# Patient Record
Sex: MALE | Race: WHITE | URBAN - METROPOLITAN AREA
[De-identification: names, ages, dates, MRNs, and addresses within clinical notes are randomized per-mention and may not be internally consistent; named-entity substitution may affect disease eponyms.]

---

## 2017-01-10 ENCOUNTER — HOSPITAL ENCOUNTER (INPATIENT)
Facility: CLINIC | Age: 32
LOS: 2 days | Discharge: HOME | DRG: 897 | End: 2017-01-13
Attending: EMERGENCY MEDICINE | Admitting: PSYCHIATRY & NEUROLOGY
Payer: COMMERCIAL

## 2017-01-10 DIAGNOSIS — E55.9 VITAMIN D DEFICIENCY: ICD-10-CM

## 2017-01-10 DIAGNOSIS — F10.220 ACUTE ALCOHOLIC INTOXICATION IN ALCOHOLISM, UNCOMPLICATED (H): ICD-10-CM

## 2017-01-10 DIAGNOSIS — F41.1 GAD (GENERALIZED ANXIETY DISORDER): Primary | ICD-10-CM

## 2017-01-10 DIAGNOSIS — F33.1 MODERATE EPISODE OF RECURRENT MAJOR DEPRESSIVE DISORDER (H): ICD-10-CM

## 2017-01-10 DIAGNOSIS — F10.220 ALCOHOL DEPENDENCE WITH UNCOMPLICATED INTOXICATION (H): ICD-10-CM

## 2017-01-10 LAB
ALCOHOL BREATH TEST: 0.29 (ref 0–0.01)
AMPHETAMINES UR QL SCN: ABNORMAL
BARBITURATES UR QL: ABNORMAL
BENZODIAZ UR QL: ABNORMAL
CANNABINOIDS UR QL SCN: ABNORMAL
COCAINE UR QL: ABNORMAL
ETHANOL UR QL SCN: ABNORMAL
GLUCOSE BLDC GLUCOMTR-MCNC: 91 MG/DL (ref 70–99)
OPIATES UR QL SCN: ABNORMAL

## 2017-01-10 PROCEDURE — HZ2ZZZZ DETOXIFICATION SERVICES FOR SUBSTANCE ABUSE TREATMENT: ICD-10-PCS | Performed by: PSYCHIATRY & NEUROLOGY

## 2017-01-10 PROCEDURE — 00000146 ZZHCL STATISTIC GLUCOSE BY METER IP

## 2017-01-10 PROCEDURE — 25000132 ZZH RX MED GY IP 250 OP 250 PS 637: Performed by: EMERGENCY MEDICINE

## 2017-01-10 PROCEDURE — 82075 ASSAY OF BREATH ETHANOL: CPT | Performed by: EMERGENCY MEDICINE

## 2017-01-10 PROCEDURE — 99284 EMERGENCY DEPT VISIT MOD MDM: CPT | Mod: Z6 | Performed by: EMERGENCY MEDICINE

## 2017-01-10 PROCEDURE — 80307 DRUG TEST PRSMV CHEM ANLYZR: CPT | Performed by: EMERGENCY MEDICINE

## 2017-01-10 PROCEDURE — 80320 DRUG SCREEN QUANTALCOHOLS: CPT | Performed by: EMERGENCY MEDICINE

## 2017-01-10 PROCEDURE — 99285 EMERGENCY DEPT VISIT HI MDM: CPT | Performed by: EMERGENCY MEDICINE

## 2017-01-10 RX ORDER — LANOLIN ALCOHOL/MO/W.PET/CERES
100 CREAM (GRAM) TOPICAL ONCE
Status: COMPLETED | OUTPATIENT
Start: 2017-01-10 | End: 2017-01-10

## 2017-01-10 RX ORDER — FOLIC ACID 1 MG/1
1 TABLET ORAL ONCE
Status: COMPLETED | OUTPATIENT
Start: 2017-01-10 | End: 2017-01-10

## 2017-01-10 RX ORDER — MULTIPLE VITAMINS W/ MINERALS TAB 9MG-400MCG
1 TAB ORAL ONCE
Status: COMPLETED | OUTPATIENT
Start: 2017-01-10 | End: 2017-01-10

## 2017-01-10 RX ADMIN — Medication 100 MG: at 22:56

## 2017-01-10 RX ADMIN — FOLIC ACID 1 MG: 1 TABLET ORAL at 22:56

## 2017-01-10 RX ADMIN — MULTIPLE VITAMINS W/ MINERALS TAB 1 TABLET: TAB at 22:56

## 2017-01-10 ASSESSMENT — ENCOUNTER SYMPTOMS
ABDOMINAL PAIN: 0
ARTHRALGIAS: 0
EYE REDNESS: 0
DIFFICULTY URINATING: 0
HEADACHES: 0
SHORTNESS OF BREATH: 0
FEVER: 0
NECK STIFFNESS: 0
CONFUSION: 0
COLOR CHANGE: 0

## 2017-01-10 NOTE — IP AVS SNAPSHOT
MRN:[id number]                      After Visit Summary   1/10/2017    Wilber Romo    MRN: [id number]           Thank you!     Thank you for choosing [facility] for your care. Our goal is always to provide you with excellent care.        Patient Information     Date Of Birth          1985        About your hospital stay     You were admitted on:  January 11, 2017 You last received care in the:  [facility]    You were discharged on:  January 13, 2017       Who to Call     For medical emergencies, please call 911.  For non-urgent questions about your medical care, please call your primary care provider or clinic, None          Attending Provider     Provider    Amaury Rene MD Simon, Mitch THOMPSON MD       Primary Care Provider    Physician No Ref-Primary       No address on file        Further instructions from your care team       Behavioral Discharge Planning and Instructions  THANK YOU FOR CHOOSING THE [address]  [phone]    Summary: You were admitted to [facility] on 10/10/17 for detoxification from alcohol.  A medical exam was performed that included lab work. You have met with a  and opted to continue your treatement with [facility].  Please take care and make your recovery a priority, Wilber!     [address]:  [phone]    Main Diagnoses:  Per Dr. Lundy    DIAGNOSTIC IMPRESSION:    Axis I:  Alcohol use disorder, severe with withdrawal.    2.  Moderate major depression, recurrent, F33.1.    3.  Generalized anxiety disorder, F41.1.    Major Treatments, Procedures and Findings:  You were treated for alcohol detoxification using valium.New medications were ordered during your treatment say.   You have had a chemical dependency assessment.  You have had labs drawn and those results have been reviewed with you. Please take a copy of  your lab work with you to your next primary care physician appointment.    Symptoms to Report:  If you experience more anxiety, confusion, sleeplessness, deep sadness or thoughts of suicide, notify your treatment team or notify your primary care physician. IF ANY OF THE SYMPTOMS YOU ARE EXPERIENCING ARE A MEDICAL EMERGENCY CALL 911 IMMEDIATELY.     Lifestyle Adjustment: Health Action Plan:  1.Create a daily schedule  2. Eat Healthy  3. Plan Enjoyable Sober Activities  4. Use Problem Solving Skills and Deal with Issues as they Arise.   5. Be Physically Active  6. Take your medications as prescribed  7. Get enough restful sleep  8. Practice Relaxation  9. Spend time with Supportive People  10. No use of alcohol, illegal drugs or addictive medications other than what is currently prescribed.   11.AA, NA Sponsor are excellent resources for support    Keeping hands clean is one of the most important steps we can take to avoid getting sick and spreading germs to others.  Please wash your hands frequently.        Follow-up Appointment:   Dr. Lucas Mclaughlin [facility]  Wednesday, February 15, 2017 @ 2:00 pm    Resources:   http://www.aastpaul.org/?topic=8  http://a[place].org/meetings/  http://www.nam[place].org/index.php/meeting-list-pdf  http://www.harmreduction.org  [facility] 323-960-7554  Support Group:  AA/TEN and Sponsor/support  Crisis Intervention: 932.735.2966 or 517-444-7324 (TTY: 623.793.7525).  Call anytime for help.  [place] on Mental Illness (www.[place].sav.org): 233.643.4043 or 893-177-3458.  Alcoholics Anonymous (www.alcoholics-anonymous.org): Check your phone book for your local chapter.  Suicide Awareness Voices of Education (SAVE) (www.save.org): 335-923-PYHF (2256)  National Suicide Prevention Line (www.mentalhealthmn.org): 332-087-JEWG (0401)  Mental Health Consumer/Survivor Network of [place] (www.mhcsn.net): 332.731.8349 or 828-128-6484  Mental Health Association [facility]  (www.mentalhealth.org): 226.913.8984 or 358-933-1521   Substance Abuse and Mental Health Services (www.samhsa.gov)   Www.smartrecovery.org  SMART Recovery's 4-Point Program  helps people recover from all types of addictive behaviors, including: alcoholism, drug abuse, substance abuse, drug addiction, alcohol abuse, gambling addiction, cocaine addiction, and addiction to other substances and activities.  SMART Recovery (Self-Management And Recovery Training) is not a 12-step group, like Alcoholics Anonymous (AA) or Narcotics Anonymous (NA).  Teaches self-empowerment and self-reliance.  * Encourages individuals to recover and live satisfying lives.  * Teaches tools and techniques for self-directed change.  * Meetings are educational and include open discussions.  * Advocates the appropriate use of prescribed medications and psychological treatments.  * Evolves as scientific knowledge of addiction recovery evolves.  Many of the modules are available online.      [place] Recovery Connection ([facility])  [facility] connects people seeking recovery to resources that help foster and sustain long-term recovery.  Whether you are seeking resources for treatment, transportation, housing, job training, education, health care or other pathways to recovery, [facility] is a great place to start.  454.865.8974.  www.[facility]525j.com.cn.org    General Medication Instructions:   See your medication sheet(s) for instructions.   Take all medicines as directed.  Make no changes unless your doctor suggests them.   Go to all your doctor visits.  Be sure to have all your required lab tests. This way, your medicines can be refilled on time.  AA/NA and Sponsors are excellent resources for support and you can find one at any support group meeting.  Do not use any form of alcohol.    Please Note:  If you have any questions at anytime after you are discharged please call the [facility] [place] detox unit 3AW unit at  "[phone].  [facility], Behavioral Intake [phone]  Please take this discharge folder with you to all your follow up appointments, it contains your lab results, diagnosis, medication list and discharge recommendations.      THANK YOU FOR CHOOSING THE [facility]     The treatment team has appreciated the opportunity to work with you.  We wish you the best in the future.        Pending Results     No orders found from 2017 to 2017.            Statement of Approval     Ordered          17 0855  I have reviewed and agree with all the recommendations and orders detailed in this document.   EFFECTIVE NOW     Comments:  Please discharge today, either to treatment or to his parent's home.  Please send dc med list and labs to his md   Approved and electronically signed by:  Mitch Lundy MD             Admission Information        Provider Department Dept Phone    1/10/2017 Mitch Lundy MD [facility]  [phone]      Your Vitals Were     Blood Pressure Pulse Temperature    138/88 mmHg 89 97.5  F (36.4  C) (Tympanic)    Respirations Height Weight    16 1.93 m (6' 4\") 102.059 kg (225 lb)    BMI (Body Mass Index) Pulse Oximetry       27.40 kg/m2 98%       MyChart Information     [employer] lets you send messages to your doctor, view your test results, renew your prescriptions, schedule appointments and more. To sign up, go to [email or web address] . Click on \"Log in\" on the left side of the screen, which will take you to the Welcome page. Then click on \"Sign up Now\" on the right side of the page.     You will be asked to enter the access code listed below, as well as some personal information. Please follow the directions to create your username and password.     Your access code is: [email or web address]  Expires: 3/8/2017  8:24 AM     Your access code will  in 90 days. If you need help or a new code, please call your [facility] clinic or [phone].        Care " EveryWhere ID     This is your Care EveryWhere ID. This could be used by other organizations to access your [facility] medical records  [id number]           Review of your medicines      START taking        Dose / Directions    acamprosate 333 MG EC tablet   Commonly known as:  CAMPRAL   Used for:  Alcohol dependence with uncomplicated intoxication (H)        Dose:  666 mg   Take 2 tablets (666 mg) by mouth 3 times daily   Quantity:  180 tablet   Refills:  3       cholecalciferol 2000 UNITS tablet   Used for:  Vitamin D deficiency        Dose:  2000 Units   Take 2,000 Units by mouth daily   Quantity:  90 tablet   Refills:  3       folic acid 1 MG tablet   Commonly known as:  FOLVITE   Used for:  Alcohol dependence with uncomplicated intoxication (H)        Dose:  1 mg   Take 1 tablet (1 mg) by mouth daily   Quantity:  90 tablet   Refills:  3       venlafaxine 75 MG Tb24 24 hr tablet   Commonly known as:  EFFEXOR-ER   Used for:  Moderate episode of recurrent major depressive disorder (H)        Dose:  75 mg   Take 1 tablet (75 mg) by mouth daily (with breakfast)   Quantity:  30 each   Refills:  3         CONTINUE these medicines which may have CHANGED, or have new prescriptions. If we are uncertain of the size of tablets/capsules you have at home, strength may be listed as something that might have changed.        Dose / Directions    * hydrOXYzine 25 MG tablet   Commonly known as:  ATARAX   This may have changed:  Another medication with the same name was added. Make sure you understand how and when to take each.   Used for:  Acute alcoholic intoxication in alcoholism, uncomplicated (H)        Dose:  25-50 mg   Take 1-2 tablets (25-50 mg) by mouth every 4 hours as needed for anxiety   Quantity:  120 tablet   Refills:  0       * hydrOXYzine 25 MG tablet   Commonly known as:  ATARAX   This may have changed:  You were already taking a medication with the same name, and this prescription was added. Make sure you  understand how and when to take each.   Used for:  EAMON (generalized anxiety disorder)        Dose:  25-50 mg   Take 1-2 tablets (25-50 mg) by mouth every 4 hours as needed for anxiety   Quantity:  120 tablet   Refills:  3       traZODone 50 MG tablet   Commonly known as:  DESYREL   This may have changed:    - when to take this  - reasons to take this   Used for:  Moderate episode of recurrent major depressive disorder (H)        Dose:  50 mg   Take 1 tablet (50 mg) by mouth At Bedtime   Quantity:  90 tablet   Refills:  3       * Notice:  This list has 2 medication(s) that are the same as other medications prescribed for you. Read the directions carefully, and ask your doctor or other care provider to review them with you.      CONTINUE these medicines which have NOT CHANGED        Dose / Directions    multivitamin, therapeutic with minerals Tabs tablet   Used for:  Acute alcoholic intoxication in alcoholism, uncomplicated (H)        Dose:  1 tablet   Take 1 tablet by mouth daily   Quantity:  90 each   Refills:  3       thiamine 100 MG tablet   Used for:  Acute alcoholic intoxication in alcoholism, uncomplicated (H)        Dose:  100 mg   Take 1 tablet (100 mg) by mouth daily   Quantity:  120 tablet   Refills:  3         STOP taking     escitalopram 10 MG tablet   Commonly known as:  LEXAPRO                Where to get your medicines      These medications were sent to [facility] [place], [place] - [address]  [address] [address]     Phone:  [phone]    - acamprosate 333 MG EC tablet  - cholecalciferol 2000 UNITS tablet  - folic acid 1 MG tablet  - hydrOXYzine 25 MG tablet  - multivitamin, therapeutic with minerals Tabs tablet  - thiamine 100 MG tablet  - traZODone 50 MG tablet  - venlafaxine 75 MG Tb24 24 hr tablet             Protect others around you: Learn how to safely use, store and throw away your medicines at www.disposemymeds.org.             Medication  List: This is a list of all your medications and when to take them. Check marks below indicate your daily home schedule. Keep this list as a reference.      Medications           Morning Afternoon Evening Bedtime As Needed    acamprosate 333 MG EC tablet   Commonly known as:  CAMPRAL   Take 2 tablets (666 mg) by mouth 3 times daily   Last time this was given:  666 mg on 1/13/2017  8:40 AM                                         cholecalciferol 2000 UNITS tablet   Take 2,000 Units by mouth daily   Last time this was given:  2,000 Units on 1/13/2017  8:40 AM                                   folic acid 1 MG tablet   Commonly known as:  FOLVITE   Take 1 tablet (1 mg) by mouth daily   Last time this was given:  1 mg on 1/13/2017  8:40 AM                                   * hydrOXYzine 25 MG tablet   Commonly known as:  ATARAX   Take 1-2 tablets (25-50 mg) by mouth every 4 hours as needed for anxiety                                   * hydrOXYzine 25 MG tablet   Commonly known as:  ATARAX   Take 1-2 tablets (25-50 mg) by mouth every 4 hours as needed for anxiety                                multivitamin, therapeutic with minerals Tabs tablet   Take 1 tablet by mouth daily   Last time this was given:  1 tablet on 1/13/2017  8:40 AM                                   thiamine 100 MG tablet   Take 1 tablet (100 mg) by mouth daily   Last time this was given:  100 mg on 1/13/2017  8:40 AM                                   traZODone 50 MG tablet   Commonly known as:  DESYREL   Take 1 tablet (50 mg) by mouth At Bedtime   Last time this was given:  50 mg on 1/12/2017  8:06 PM                                   venlafaxine 75 MG Tb24 24 hr tablet   Commonly known as:  EFFEXOR-ER   Take 1 tablet (75 mg) by mouth daily (with breakfast)   Last time this was given:  75 mg on 1/13/2017  8:40 AM                                   * Notice:  This list has 2 medication(s) that are the same as other medications prescribed for you. Read the  directions carefully, and ask your doctor or other care provider to review them with you.

## 2017-01-10 NOTE — IP AVS SNAPSHOT
[facility]    [address] [facility] [address]    Phone:  [phone]                                       After Visit Summary   1/10/2017    Wilber Romo    MRN: [id number]           After Visit Summary Signature Page     I have received my discharge instructions, and my questions have been answered. I have discussed any challenges I see with this plan with the nurse or doctor.    ..........................................................................................................................................  Patient/Patient Representative Signature      ..........................................................................................................................................  Patient Representative Print Name and Relationship to Patient    ..................................................               ................................................  Date                                            Time    ..........................................................................................................................................  Reviewed by Signature/Title    ...................................................              ..............................................  Date                                                            Time

## 2017-01-11 PROBLEM — F10.20 ALCOHOL DEPENDENCE (H): Status: ACTIVE | Noted: 2017-01-11

## 2017-01-11 PROCEDURE — 25000132 ZZH RX MED GY IP 250 OP 250 PS 637: Performed by: EMERGENCY MEDICINE

## 2017-01-11 PROCEDURE — 25000132 ZZH RX MED GY IP 250 OP 250 PS 637: Performed by: PSYCHIATRY & NEUROLOGY

## 2017-01-11 PROCEDURE — 12800008 ZZH R&B CD ADULT

## 2017-01-11 RX ORDER — ACAMPROSATE CALCIUM 333 MG/1
666 TABLET, DELAYED RELEASE ORAL 3 TIMES DAILY
Status: DISCONTINUED | OUTPATIENT
Start: 2017-01-11 | End: 2017-01-13 | Stop reason: HOSPADM

## 2017-01-11 RX ORDER — FOLIC ACID 1 MG/1
1 TABLET ORAL DAILY
Qty: 90 TABLET | Refills: 3 | Status: SHIPPED | OUTPATIENT
Start: 2017-01-11

## 2017-01-11 RX ORDER — VENLAFAXINE HYDROCHLORIDE 37.5 MG/1
37.5 TABLET, EXTENDED RELEASE ORAL
Status: COMPLETED | OUTPATIENT
Start: 2017-01-11 | End: 2017-01-11

## 2017-01-11 RX ORDER — MULTIPLE VITAMINS W/ MINERALS TAB 9MG-400MCG
1 TAB ORAL DAILY
Qty: 90 EACH | Refills: 3 | Status: SHIPPED | OUTPATIENT
Start: 2017-01-11

## 2017-01-11 RX ORDER — VENLAFAXINE HYDROCHLORIDE 37.5 MG/1
37.5 TABLET, EXTENDED RELEASE ORAL
Status: DISCONTINUED | OUTPATIENT
Start: 2017-01-11 | End: 2017-01-11

## 2017-01-11 RX ORDER — HYDROXYZINE HYDROCHLORIDE 25 MG/1
25-50 TABLET, FILM COATED ORAL EVERY 4 HOURS PRN
Status: DISCONTINUED | OUTPATIENT
Start: 2017-01-11 | End: 2017-01-13 | Stop reason: HOSPADM

## 2017-01-11 RX ORDER — TRAZODONE HYDROCHLORIDE 50 MG/1
50 TABLET, FILM COATED ORAL AT BEDTIME
Qty: 90 TABLET | Refills: 3 | Status: SHIPPED | OUTPATIENT
Start: 2017-01-11

## 2017-01-11 RX ORDER — ACAMPROSATE CALCIUM 333 MG/1
666 TABLET, DELAYED RELEASE ORAL 3 TIMES DAILY
Qty: 180 TABLET | Refills: 3 | Status: SHIPPED | OUTPATIENT
Start: 2017-01-11

## 2017-01-11 RX ORDER — NICOTINE 21 MG/24HR
1 PATCH, TRANSDERMAL 24 HOURS TRANSDERMAL DAILY
Status: DISCONTINUED | OUTPATIENT
Start: 2017-01-11 | End: 2017-01-13 | Stop reason: HOSPADM

## 2017-01-11 RX ORDER — VENLAFAXINE HYDROCHLORIDE 75 MG/1
75 TABLET, EXTENDED RELEASE ORAL
Status: DISCONTINUED | OUTPATIENT
Start: 2017-01-12 | End: 2017-01-13 | Stop reason: HOSPADM

## 2017-01-11 RX ORDER — HYDROXYZINE HYDROCHLORIDE 25 MG/1
25-50 TABLET, FILM COATED ORAL EVERY 4 HOURS PRN
Qty: 120 TABLET | Refills: 3 | Status: SHIPPED | OUTPATIENT
Start: 2017-01-11

## 2017-01-11 RX ORDER — VENLAFAXINE HYDROCHLORIDE 75 MG/1
75 TABLET, EXTENDED RELEASE ORAL
Qty: 30 EACH | Refills: 3 | Status: SHIPPED | OUTPATIENT
Start: 2017-01-12

## 2017-01-11 RX ORDER — TRAZODONE HYDROCHLORIDE 150 MG/1
150 TABLET ORAL AT BEDTIME
Qty: 90 TABLET | Refills: 1 | Status: SHIPPED | OUTPATIENT
Start: 2017-01-11 | End: 2017-01-11

## 2017-01-11 RX ORDER — LANOLIN ALCOHOL/MO/W.PET/CERES
100 CREAM (GRAM) TOPICAL DAILY
Status: COMPLETED | OUTPATIENT
Start: 2017-01-11 | End: 2017-01-13

## 2017-01-11 RX ORDER — MULTIPLE VITAMINS W/ MINERALS TAB 9MG-400MCG
1 TAB ORAL DAILY
Status: DISCONTINUED | OUTPATIENT
Start: 2017-01-11 | End: 2017-01-13 | Stop reason: HOSPADM

## 2017-01-11 RX ORDER — TRAZODONE HYDROCHLORIDE 150 MG/1
150 TABLET ORAL AT BEDTIME
Status: DISCONTINUED | OUTPATIENT
Start: 2017-01-11 | End: 2017-01-11

## 2017-01-11 RX ORDER — DIAZEPAM 5 MG
5-20 TABLET ORAL EVERY 30 MIN PRN
Status: DISCONTINUED | OUTPATIENT
Start: 2017-01-11 | End: 2017-01-13 | Stop reason: HOSPADM

## 2017-01-11 RX ORDER — FOLIC ACID 1 MG/1
1 TABLET ORAL DAILY
Status: DISCONTINUED | OUTPATIENT
Start: 2017-01-11 | End: 2017-01-13 | Stop reason: HOSPADM

## 2017-01-11 RX ORDER — TRAZODONE HYDROCHLORIDE 50 MG/1
50 TABLET, FILM COATED ORAL AT BEDTIME
Status: DISCONTINUED | OUTPATIENT
Start: 2017-01-11 | End: 2017-01-13 | Stop reason: HOSPADM

## 2017-01-11 RX ORDER — LANOLIN ALCOHOL/MO/W.PET/CERES
100 CREAM (GRAM) TOPICAL DAILY
Qty: 120 TABLET | Refills: 3 | Status: SHIPPED | OUTPATIENT
Start: 2017-01-11

## 2017-01-11 RX ORDER — ESCITALOPRAM OXALATE 10 MG/1
10 TABLET ORAL DAILY
Status: DISCONTINUED | OUTPATIENT
Start: 2017-01-11 | End: 2017-01-11

## 2017-01-11 RX ADMIN — FOLIC ACID 1 MG: 1 TABLET ORAL at 09:01

## 2017-01-11 RX ADMIN — ACAMPROSATE CALCIUM 666 MG: 333 TABLET, DELAYED RELEASE ORAL at 21:04

## 2017-01-11 RX ADMIN — DIAZEPAM 10 MG: 5 TABLET ORAL at 00:55

## 2017-01-11 RX ADMIN — DIAZEPAM 10 MG: 5 TABLET ORAL at 21:04

## 2017-01-11 RX ADMIN — TRAZODONE HYDROCHLORIDE 50 MG: 50 TABLET ORAL at 21:56

## 2017-01-11 RX ADMIN — ACAMPROSATE CALCIUM 666 MG: 333 TABLET, DELAYED RELEASE ORAL at 09:01

## 2017-01-11 RX ADMIN — ACAMPROSATE CALCIUM 666 MG: 333 TABLET, DELAYED RELEASE ORAL at 16:18

## 2017-01-11 RX ADMIN — DIAZEPAM 10 MG: 5 TABLET ORAL at 09:01

## 2017-01-11 RX ADMIN — MULTIPLE VITAMINS W/ MINERALS TAB 1 TABLET: TAB at 09:01

## 2017-01-11 RX ADMIN — Medication 100 MG: at 09:01

## 2017-01-11 RX ADMIN — DIAZEPAM 10 MG: 5 TABLET ORAL at 16:19

## 2017-01-11 RX ADMIN — DIAZEPAM 10 MG: 5 TABLET ORAL at 13:11

## 2017-01-11 RX ADMIN — NICOTINE 1 PATCH: 21 PATCH, EXTENDED RELEASE TRANSDERMAL at 09:00

## 2017-01-11 RX ADMIN — VENLAFAXINE HYDROCHLORIDE 37.5 MG: 37.5 TABLET, EXTENDED RELEASE ORAL at 09:01

## 2017-01-11 ASSESSMENT — ACTIVITIES OF DAILY LIVING (ADL)
LAUNDRY: WITH SUPERVISION
ORAL_HYGIENE: INDEPENDENT
DRESS: SCRUBS (BEHAVIORAL HEALTH)
GROOMING: INDEPENDENT

## 2017-01-11 NOTE — ED PROVIDER NOTES
History     Chief Complaint   Patient presents with     Alcohol Problem     Drinking 1/2 liter of Vodka daily. Seeing Detox and Treatment. Last drink 2 hours ago.      HPI  Wilber Romo is a 31 year old male who is emergency Department seeking detox from alcohol.  Patient states that he's been drinking alcohol daily for the past 1-2 weeks.  Patient states he's been drinking 1/2 L of vodka per day.  He states his last drink was approximately 2 hours prior to presentation to the emergency department.  He has had alcohol withdrawal the past and is consisted of tremors.  The patient denies a history of DTs.  He states he did have an alcohol withdrawal seizure 2 years ago.  Patient has a history of depression and anxiety.  He states that he ran out of his Lexapro 2 days ago.  Patient denies any suicide ideation.  Patient denies any recent fall or injury.  He denies any recent illness or medical concerns.  The patient had labs performed approximately one month ago when admitted to detox here.  They were normal at the time.    I have reviewed the Medications, Allergies, Past Medical and Surgical History, and Social History in the Epic system.    Review of Systems   Constitutional: Negative for fever.   HENT: Negative for congestion.    Eyes: Negative for redness.   Respiratory: Negative for shortness of breath.    Cardiovascular: Negative for chest pain.   Gastrointestinal: Negative for abdominal pain.   Genitourinary: Negative for difficulty urinating.   Musculoskeletal: Negative for arthralgias and neck stiffness.   Skin: Negative for color change.   Neurological: Negative for headaches.   Psychiatric/Behavioral: Negative for confusion.   All other systems reviewed and are negative.      Physical Exam   BP: 134/75 mmHg  Heart Rate: 74  Temp: 96.5  F (35.8  C)  Resp: 16  Height: 182.9 cm (6')  Weight: 102.059 kg (225 lb)  SpO2: 95 %  Physical Exam   Constitutional: He appears well-developed and well-nourished. No  distress.   HENT:   Head: Normocephalic and atraumatic.   Mouth/Throat: No oropharyngeal exudate.   Eyes: Pupils are equal, round, and reactive to light. No scleral icterus.   Neck: Normal range of motion.   Cardiovascular: Normal rate, regular rhythm, normal heart sounds and intact distal pulses.    Pulmonary/Chest: Effort normal and breath sounds normal. No respiratory distress.   Abdominal: Soft. Bowel sounds are normal. There is no tenderness.   Musculoskeletal: Normal range of motion. He exhibits no edema or tenderness.   Neurological: He is alert. He has normal strength. No cranial nerve deficit. Coordination and gait normal.   Skin: Skin is warm and dry. No rash noted. He is not diaphoretic.   Psychiatric: He has a normal mood and affect. His behavior is normal.   Nursing note and vitals reviewed.      ED Course   Procedures            Critical Care time:    Results for orders placed or performed during the hospital encounter of 01/10/17   Glucose by meter   Result Value Ref Range    Glucose 91 70 - 99 mg/dL   Alcohol breath test POCT   Result Value Ref Range    Alcohol Breath Test 0.288 (A) 0.00 - 0.01        Assessments & Plan (with Medical Decision Making)   31 year old male presents to the emergency department seeking detox from alcohol.  The patient's breathalyzer is currently 0.28.  He has a history of withdrawal including tremors.  His remote history of withdrawal seizure.  Patient does not have any medical concerns.  He has normal vital signs and a normal physical exam aside for mild alcohol intoxication.  His blood glucose level is normal.  The patient appears medically stable for detox admission.  I have reviewed the nursing notes.    I have reviewed the findings, diagnosis, plan and need for follow up with the patient.    New Prescriptions    No medications on file       Final diagnoses:   Alcohol dependence with uncomplicated intoxication (H)       1/10/2017   [facility], [place], EMERGENCY  DEPARTMENT      Amaury Rene MD  01/10/17 0250

## 2017-01-11 NOTE — H&P
"Mr. Wilber Romo is a 31-year-old man admitted to [facility] detox unit on 1/10/2017.  He was admitted to the hospital to detoxify from alcohol.  He was here for detox in 12/2016 and left 12/08.  He was sober for 3 weeks.  He attributes his relapse to depression and anxiety.  He is here now because he needs to go through withdrawal and states, \"I can't do it again by myself.\"  There is a history of a withdrawal seizure about 2 years ago.  He has been drinking approximately 1/2 liter per day and his alcohol level was 0.288.  Liver labs were mildly elevated when he was here previously in December.      He has a history of attention deficit disorder as a child with previous treatments being Ritalin, Adderall and some other longer acting form.  He does not believe he has taken Strattera.  Currently, he feels this is a mild impact on his life.  He notes his father is taking attention deficit medications still.  Depression symptoms consist of hopelessness, not wanting to do much, sporadic sleep, decreased appetite and occasional racing thoughts.  Anxiety symptoms consist of social anxiety, nervousness, twitching, shaking, and palpitations.  He screens negative on the MDQ for bipolar illness.      He has taken 1 medication for depression, Lexapro with partial results.  He has not taken past antidepressants.  He has taken hydroxyzine for anxiety with fairly good results and no side effects.  He has not taken any medication for alcoholism.      MENTAL STATUS EXAMINATION:  Shows an alert, cooperative 31-year-old man.  He notes he feels hung over.  Eye contact is good.  Affect is fair to good.  Psychomotor behavior is normal.  There are no signs of psychosis.  Speech rate, production and volume are normal.  He is not suicidal.  He does not appear to be hallucinating.      VITAL SIGNS:  Temperature 98.1, heart rate 84, respirations 16, blood pressure 119/71.  SPO2 98 on room air.      DIAGNOSTIC IMPRESSION: "   Axis I:  Alcohol use disorder, severe with withdrawal.   2.  Moderate major depression, recurrent, F33.1.   3.  Generalized anxiety disorder, F41.1.      PLAN:  Plan at this time is to switch the Lexapro to Effexor.  Campral will be started for alcohol cravings.  We discussed use of Strattera, but at this point he does not feel that is necessary.  He plans to go to [facility] following detoxification.         SELVIN CENTENO MD             D: 2017 08:40   T: 2017 08:50   MT:       Name:     TREVER CAMERON   MRN:      [id number]        Account:      [id number]   :      1985           Admitted:     [id number]      Document: [id number]

## 2017-01-11 NOTE — PLAN OF CARE
Problem: Substance Withdrawal  Goal: Substance Withdrawal  Signs and symptoms of listed problems will be absent or manageable. 1. Detoxification from Alcohol using the [facility] valium protocol  2. Patient will complete assessment paperwork  3. Patient will meet with  to discuss treatment and discharge planning  4. Physical examination and Lab evaluation by MD  5. Patients oral intake will be greater than 75 % of meals to meet estimated needs  6. Adequate fluid intake  Outcome: No Change  Pt being monitored for alcohol withdrawal. Pt was medicated x 2 this shift with 10 mg valium. Pt resting in bed off and on this shift. States he is having some mild depression rating his depression level a 2-3 on a 0-10 severe scale. Pt states last time he was here residential treatment was recommended but he did not follow through. States he is now motivated to go to treatment and wants to [facility].

## 2017-01-11 NOTE — CONSULTS
HISTORY OF PRESENT ILLNESS:  Wilber Romo is a 31-year-old male with reported history of major depressive disorder, alcohol dependence and panic disorder admitted to station [facility] for alcohol abuse and detoxification.  Reportedly drinking half a liter of hard liquor daily for the past couple of weeks.  Breathalyzer on admission 0.288.  The patient states the last use was 8 p.m. yesterday evening.  An Internal Medicine consultation was ordered by Dr. Lundy to assess medical problems including borderline elevated blood pressure on admission.  The patient denies history of primary hypertension.  Blood pressure this morning was 141/81, most recently 135/80.  The patient denies acute physical concerns, including chest pain, shortness of breath.      PAST MEDICAL HISTORY:   1.  Psychiatric history per Dr. Lundy.   2.  Denies history of major medical problems including cardiopulmonary disease, hypertension and diabetes.      PAST SURGICAL HISTORY:  None.      ADMISSION MEDICATIONS:   1.  Lexapro 10 mg p.o. daily.      ALLERGIES:  Penicillin.      SOCIAL HISTORY:  Single.  No children.  Lives in [place].  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  Smokes on average 10 cigarettes daily.  Last used alcohol last night at 8 p.m.      FAMILY HISTORY:  Reviewed and noncontributory.      REVIEW OF SYSTEMS:  Ten-point review of systems negative except as stated above in history of present illness.      PHYSICAL EXAMINATION:   GENERAL:  Nontoxic appearing young man in no acute distress.   VITAL SIGNS:  Stable.  Temperature afebrile, pulse 90s, blood pressure 130s/80s.   HEENT:  Negative.   NECK:  Supple.  No cervical lymphadenopathy or thyromegaly.   LUNGS:  Clear.   CARDIOVASCULAR:  Regular rate and rhythm, no murmurs appreciated.   ABDOMEN:  Soft, nontender.   EXTREMITIES:  No edema.   SKIN:  No rash noted on exposed areas.   NEUROLOGIC:  Awake, alert and oriented x3.  Cranial nerves grossly intact.  Motor strength is symmetric.  He  is not tremulous.      LABORATORY DATA:  Urine drug screen positive for alcohol.  Breathalyzer on admission 0.288.  Blood sugar 91.      IMPRESSION:   1.  Alcohol abuse and withdrawal per Dr. Lundy.   2.  Borderline elevated blood pressure since admission, stable, most likely secondary to alcohol withdrawal.  No history of essential hypertension.   3.  Otherwise, overall apparent normally good physical health.      PLAN:  Blood pressure will be monitored closely, otherwise no medical intervention is indicated at this time.  The patient is medically stable.  I will be happy to follow up and see him during his stay for any intercurrent medical issues.         RUPERTO LINDSEY PA-C             D: 2017 16:13   T: 2017 17:26   MT: GINNY      Name:     TREVER CAMERON   MRN:      [id number]        Account:       [id number]   :      1985           Consult Date:  2017      Document: [id number]

## 2017-01-11 NOTE — PROGRESS NOTES
Checked in with pt regarding aftercare plans.  He would like to go to [facility].  Writer called the admission coordinator at [facility] that pt worked with the last time he was here in December 2016, Maryfrank Nascimento (Phone: [phone], Fax: [phone]) and she requested that writer fax over pt's CD assessment done during the last hospitalization and his current H&P, facesheet, and ED note.  She also requested that pt call her to discuss his desire to come to the program.  Writer faxed over that information and provided pt with Mary's phone number.  Pt agreed to call her today.

## 2017-01-11 NOTE — PROGRESS NOTES
01/11/17 0136   Patient Belongings   Did you bring any home meds/supplements to the hospital?  No   Patient Belongings other (see comments)   Disposition of Belongings Tote, Locked Drawer   Belongings Search Yes   Clothing Search Yes   Second Staff Puneet RN 3AW     Tote: black jackt, pair of brown shoes, belt, watch    Locked Drawer: cell phone    ADMISSION:  I am responsible for any personal items that are not sent to the safe or pharmacy. [facility] is not responsible for loss, theft or damage of any property in my possession.    Patient Signature _____________________ Date/Time _____________________    Staff Signature _______________________ Date/Time _____________________    2nd Staff person, if patient is unable/unwilling to sign  ___________________________________ Date/Time _____________________  DISCHARGE:  All personal items have been returned to me.    Patient Signature _____________________ Date/Time _____________________    Staff Signature _______________________ Date/Time _____________________

## 2017-01-12 LAB
ALBUMIN SERPL-MCNC: 3.7 G/DL (ref 3.4–5)
ALP SERPL-CCNC: 82 U/L (ref 40–150)
ALT SERPL W P-5'-P-CCNC: 43 U/L (ref 0–70)
ANION GAP SERPL CALCULATED.3IONS-SCNC: 5 MMOL/L (ref 3–14)
AST SERPL W P-5'-P-CCNC: 31 U/L (ref 0–45)
BILIRUB SERPL-MCNC: 1.2 MG/DL (ref 0.2–1.3)
BUN SERPL-MCNC: 9 MG/DL (ref 7–30)
CALCIUM SERPL-MCNC: 8.9 MG/DL (ref 8.5–10.1)
CHLORIDE SERPL-SCNC: 108 MMOL/L (ref 94–109)
CO2 SERPL-SCNC: 28 MMOL/L (ref 20–32)
CREAT SERPL-MCNC: 0.88 MG/DL (ref 0.66–1.25)
DEPRECATED CALCIDIOL+CALCIFEROL SERPL-MC: 17 UG/L (ref 20–75)
ERYTHROCYTE [DISTWIDTH] IN BLOOD BY AUTOMATED COUNT: 11.5 % (ref 10–15)
GFR SERPL CREATININE-BSD FRML MDRD: NORMAL ML/MIN/1.7M2
GGT SERPL-CCNC: 49 U/L (ref 0–75)
GLUCOSE SERPL-MCNC: 96 MG/DL (ref 70–99)
HCT VFR BLD AUTO: 45 % (ref 40–53)
HGB BLD-MCNC: 15.6 G/DL (ref 13.3–17.7)
MCH RBC QN AUTO: 33.3 PG (ref 26.5–33)
MCHC RBC AUTO-ENTMCNC: 34.7 G/DL (ref 31.5–36.5)
MCV RBC AUTO: 96 FL (ref 78–100)
PLATELET # BLD AUTO: 170 10E9/L (ref 150–450)
POTASSIUM SERPL-SCNC: 4 MMOL/L (ref 3.4–5.3)
PROT SERPL-MCNC: 6.8 G/DL (ref 6.8–8.8)
RBC # BLD AUTO: 4.68 10E12/L (ref 4.4–5.9)
SODIUM SERPL-SCNC: 141 MMOL/L (ref 133–144)
TSH SERPL DL<=0.005 MIU/L-ACNC: 0.52 MU/L (ref 0.4–4)
WBC # BLD AUTO: 5.8 10E9/L (ref 4–11)

## 2017-01-12 PROCEDURE — 85027 COMPLETE CBC AUTOMATED: CPT | Performed by: PSYCHIATRY & NEUROLOGY

## 2017-01-12 PROCEDURE — 80053 COMPREHEN METABOLIC PANEL: CPT | Performed by: PSYCHIATRY & NEUROLOGY

## 2017-01-12 PROCEDURE — 36415 COLL VENOUS BLD VENIPUNCTURE: CPT | Performed by: PSYCHIATRY & NEUROLOGY

## 2017-01-12 PROCEDURE — 84443 ASSAY THYROID STIM HORMONE: CPT | Performed by: PSYCHIATRY & NEUROLOGY

## 2017-01-12 PROCEDURE — 82306 VITAMIN D 25 HYDROXY: CPT | Performed by: PSYCHIATRY & NEUROLOGY

## 2017-01-12 PROCEDURE — 12800008 ZZH R&B CD ADULT

## 2017-01-12 PROCEDURE — 25000132 ZZH RX MED GY IP 250 OP 250 PS 637: Performed by: EMERGENCY MEDICINE

## 2017-01-12 PROCEDURE — 82977 ASSAY OF GGT: CPT | Performed by: PSYCHIATRY & NEUROLOGY

## 2017-01-12 PROCEDURE — 25000132 ZZH RX MED GY IP 250 OP 250 PS 637: Performed by: PSYCHIATRY & NEUROLOGY

## 2017-01-12 PROCEDURE — H0001 ALCOHOL AND/OR DRUG ASSESS: HCPCS

## 2017-01-12 RX ADMIN — TRAZODONE HYDROCHLORIDE 50 MG: 50 TABLET ORAL at 20:06

## 2017-01-12 RX ADMIN — MULTIPLE VITAMINS W/ MINERALS TAB 1 TABLET: TAB at 08:36

## 2017-01-12 RX ADMIN — ACAMPROSATE CALCIUM 666 MG: 333 TABLET, DELAYED RELEASE ORAL at 14:46

## 2017-01-12 RX ADMIN — NICOTINE 1 PATCH: 21 PATCH, EXTENDED RELEASE TRANSDERMAL at 08:35

## 2017-01-12 RX ADMIN — ACAMPROSATE CALCIUM 666 MG: 333 TABLET, DELAYED RELEASE ORAL at 20:06

## 2017-01-12 RX ADMIN — FOLIC ACID 1 MG: 1 TABLET ORAL at 08:36

## 2017-01-12 RX ADMIN — ACAMPROSATE CALCIUM 666 MG: 333 TABLET, DELAYED RELEASE ORAL at 08:36

## 2017-01-12 RX ADMIN — VENLAFAXINE HYDROCHLORIDE 75 MG: 75 TABLET, EXTENDED RELEASE ORAL at 08:36

## 2017-01-12 RX ADMIN — Medication 100 MG: at 08:36

## 2017-01-12 ASSESSMENT — ACTIVITIES OF DAILY LIVING (ADL)
GROOMING: INDEPENDENT
DRESS: STREET CLOTHES
ORAL_HYGIENE: INDEPENDENT
LAUNDRY: WITH SUPERVISION

## 2017-01-12 NOTE — PROGRESS NOTES
No medications for alcohol withdrawal this shift. Pt out on unit. Does not attend all of the unit programming. Pt has poor eye contact. Pt looking at treatment options. Continues on effexor titration.

## 2017-01-12 NOTE — CONSULTS
Internal Medicine Consult completed by Isrrael DIAMOND on 1/11/17. Please refer to his note for details.    Sarita Godinez PA-C  Hospitalist Service  [phone]

## 2017-01-12 NOTE — PROGRESS NOTES
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 practices. Include time spent obtaining, using, recovering from drugs or alcohol. (DSM)     I work, clean, drink and watch sports on T.V.     2B. How often do you spend more time than you planned using or use more than you planned? (DSM)     Most nights.     3. How important is using to your social connections? Do many of your family or friends use?     Not important at all.        4A. Are you currently in a significant relationship? No    4C. Sexual Orientation: Heterosexual    5A. Who do you live with?  2 college friends.    5B. Tell me about their alcohol/drug use and mental health issues. The people I live with have no alcohol/drug use and/or mental health issues.    5C. Are you concerned for your safety there? no.    5D. Are you concerned about the safety of anyone else who lives with you? no.    6A. Do you have children who live with you? NA    6B. Do you have children who do not live with you? NA    7A. Who supports you in making changes in your alcohol or drug use? What are they willing to do to support you? Who is upset or angry about you making changes in your alcohol or drug use? How big a problem is  this for you?      My family and friends are supportive. I am sure some would help if I needed something and if they knew I was working hard on my sobriety.     7B. This table is provided to record information about the person s relationships and available support It is not necessary to ask each item; only to get a comprehensive picture of their support system.  How often can you count on the following people when you need someone?    Partner / Spouse  N/A    Parent(s)/Aunt(s)/Uncle(s)/Grandparents  Always supportive    Sibling(s)/Cousin(s)  Always supportive    Child(liya)  N/A    Other relative(s)  Always supportive    Friend(s)/neighbor(s)  Always supportive    Child(liya) s father(s)/mother(s)  N/A    Support group member(s)  Always supportive    Community of raiza members  Always supportive    /counselor/therapist/healer  Always supportive    Other (specify)  N/A      8A. What is your current living situation?     I am currently living with 2 roomates.    8B. What is your long term plan for where you will be living?     I plan to move back in with my parents for a bit then move back in with my 2 roommates.     8C. Tell me about your living environment/neighborhood? Ask enough follow up questions to determine safety, criminal activity, availability of alcohol and drugs, supportive or antagonistic to the person making changes.      Everything is safe and I have no concerns.      9. Criminal justice history: Gather current/recent history and any significant history related to substance use--Arrests? Convictions? Circumstances? Alcohol or drug involvement? Sentences? Still on probation or parole? Expectations of the court? Current court order? Any sex offenses - lifetime? What level? (DSM)    NA    10. What obstacles exist to participating in treatment? (Time off work, childcare, funding, transportation, pending nursing home time, living situation)     Work/finances      Dimension VI Ratings    Recovery  environment - The placing authority must use the criteria in Dimension VI to determine a client s recovery environment.    RISK DESCRIPTIONS - Severity ratin Client is engaged in structured, meaningful activity and has a supportive significant other, family, and living environment.    REASONS SEVERITY WAS ASSIGNED - (What support does the person have for making changes? What structure/stability does the person have in his or her daily life that will increase the likelihood that changes can be sustained? What problems exist in the person s environment that will jeopardize getting/staying clean and sober?)     Patient reports his current living situation is supportive towards his recovery. Pt reports that he is currently living with 2 roomates. Pt reports that he lacks a daily structure other than work and meaningful activities. Pt reports that he is currently employed. Pt lacks a sober support network.   Pt denies any legal problems.         Client Choice/Exceptions    Would you like services specific to language, age, gender, culture, Protestant preference, race, ethnicity, sexual orientation or disability?  No    What particular treatment choices and options would you like to have? Not Sure.    Do you have a preference for a particular treatment program? Not Sure.      Criteria for Diagnosis      Criteria for Diagnosis  DSM-5 Criteria for Substance Use Disorder  Instructions: Determine whether the client currently meets the criteria for Substance Use Disorder using the diagnostic criteria in the DSM-V pp.484-781. Current means during the most recent 12 months outside a facility that controls access to substances      Category of Substance  Severity (ICD-10 Code / DSM 5 Code)      Alcohol Use Disorder  Severe  (10.20) (303.90)    Cannabis Use Disorder  NA    Hallucinogen Use Disorder  NA    Inhalant Use Disorder  NA    Opioid Use Disorder  NA    Sedative, Hypnotic, or Anxiolytic Use Disorder  NA    Stimulant  "Related Disorder  NA    Tobacco Use Disorder  Mild    (Z72.0) (305.1)    Other (or unknown) Substance Use Disorder  NA        Collateral Contact Summary    Number of contacts made: 2    Contact with referring person:  N/A.    If court related records were reviewed, summarize here: N/A    Information from collateral contacts supported/largely agreed with information from the client and associated risk ratings.      Rule 25 Assessment Summary and Plan    's Recommendation    1)  Complete a residential based or similar treatment program similar to [facility].    2)  Abstain from all mood-altering chemicals unless prescribed by a licensed provider.    3)  Attend weekly 12-step support group meetings.      4)  Actively work with a male sponsor or  through [place] [facility] ([phone]).    5)  Follow all the recommendations of your treatment/medical providers.  6)  Remain law abiding.  7)  Patient may benefit from obtaining a full mental health evaluation.  8)  Patient may benefit from 1:1 psychotherapy due to feelings of depression and/or anxiety.            Collateral Contacts        Name:    Dr. SWAPNA Garcia MD    Relationship:    3A Physician    Phone Number:    [phone]  Releases:          \"The patient began to drink alcohol in 2014, it became a problem.  He has tolerance, blackouts, withdrawal, progressive use, loss of control, used despite negative consequences, job, money, relationships.  He has tried to cut alcohol.  He realized that he needs to get help.  Does not use any drugs.  Smokes 1 pack a day.  In 2009 he does have anxiety.  When he gets anxiety, he feels like he cannot go into crowds.  He was going to [employer] school.  During this time that he would anxiety, his heart rate would increase, he would become shaky, shortness of breath, sweaty.  Denies suicidal or homicidal ideation, denied auditory or visual hallucinations.  He does have depression at times " "where he would feel sad, hopeless, would hurt himself.  Energy, motivation and interest suffer.  He does have a history of withdrawal seizures.  He has had blackouts as well.  He would have fleeting thoughts about he was better off if he did not wake up, but usually this is in the context of him going into withdrawal.  At this time, patient does not have suicidal or homicidal ideation, denied auditory or visual hallucinations.\"      Collateral Contacts        Name    [facility] Relationship     Phone Number      Releases            Mitch Lundy MD Physician Signed Psychiatry H&P 1/11/2017  8:40 AM        Mr. Wilber Romo is a 31-year-old man admitted to [facility] detox unit on 1/10/2017.  He was admitted to the hospital to detoxify from alcohol.  He was here for detox in 12/2016 and left 12/08.  He was sober for 3 weeks.  He attributes his relapse to depression and anxiety.  He is here now because he needs to go through withdrawal and states, \"I can't do it again by myself.\"  There is a history of a withdrawal seizure about 2 years ago.  He has been drinking approximately 1/2 liter per day and his alcohol level was 0.288.  Liver labs were mildly elevated when he was here previously in December.        He has a history of attention deficit disorder as a child with previous treatments being Ritalin, Adderall and some other longer acting form.  He does not believe he has taken Strattera.  Currently, he feels this is a mild impact on his life.  He notes his father is taking attention deficit medications still.  Depression symptoms consist of hopelessness, not wanting to do much, sporadic sleep, decreased appetite and occasional racing thoughts.  Anxiety symptoms consist of social anxiety, nervousness, twitching, shaking, and palpitations.  He screens negative on the MDQ for bipolar illness.        He has taken 1 medication for depression, Lexapro with partial results.  He has not taken past " antidepressants.  He has taken hydroxyzine for anxiety with fairly good results and no side effects.  He has not taken any medication for alcoholism.        MENTAL STATUS EXAMINATION:  Shows an alert, cooperative 31-year-old man.  He notes he feels hung over.  Eye contact is good.  Affect is fair to good.  Psychomotor behavior is normal.  There are no signs of psychosis.  Speech rate, production and volume are normal.  He is not suicidal.  He does not appear to be hallucinating.        VITAL SIGNS:  Temperature 98.1, heart rate 84, respirations 16, blood pressure 119/71. SPO2 98 on room air.        DIAGNOSTIC IMPRESSION:    Axis I:  Alcohol use disorder, severe with withdrawal.    2.  Moderate major depression, recurrent, F33.1.    3.  Generalized anxiety disorder, F41.1.        PLAN:  Plan at this time is to switch the Lexapro to Effexor.  Campral will be started for alcohol cravings.  We discussed use of Strattera, but at this point he does not feel that is necessary.  He plans to go to [facility] following detoxification.            SELVIN CENTENO MD                     :                      Name    Tiera Romo    Relationship    Mother  Phone Number    [phone]  Releases    Yes        Information Provided:  This writer reviewed this patients Electronic Medical Record and the information reviewed largely supports the information the patient reported during his CD evaluation.    llateral Contacts      A problematic pattern of alcohol/drug use leading to clinically significant impairment or distress, as manifested by at least two of the following, occurring within a 12-month period:    Alcohol/drug is often taken in larger amounts or over a longer period than was intended.  There is a persistent desire or unsuccessful efforts to cut down or control alcohol/drug use  A great deal of time is spent in activities necessary to obtain alcohol, use alcohol, or recover from its effects.  Craving, or a strong  desire or urge to use alcohol/drug  Recurrent alcohol/drug use resulting in a failure to fulfill major role obligations at work, school or home.  Continued alcohol use despite having persistent or recurrent social or interpersonal problems caused or exacerbated by the effects of alcohol/drug.  Important social, occupational, or recreational activities are given up or reduced because of alcohol/drug use.  Recurrent alcohol/drug use in situations in which it is physically hazardous.  Alcohol/drug use is continued despite knowledge of having a persistent or recurrent physical or psychological problem that is likely to have been caused or exacerbated by alcohol.  Tolerance, as defined by either of the following: A need for markedly increased amounts of alcohol/drug to achieve intoxication or desired effect. and A markedly diminished effect with continued use of the same amount of alcohol/drug.  Withdrawal, as manifested by either of the following: The characteristic withdrawal syndrome for alcohol/drug (refer to Criteria A and B of the criteria set for alcohol/drug withdrawal). and Alcohol/drug (or a closely related substance, such as a benzodiazepine) is taken to relieve or avoid withdrawal symptoms.      Specify if: In early remission:  After full criteria for alcohol/drug use disorder were previously met, none of the criteria for alcohol/drug use disorder have been met for at least 3 months but for less than 12 months (with the exception that Criterion A4,  Craving or a strong desire or urge to use alcohol/drug  may be met).      In sustained remission:    After full criteria for alcohol use disorder were previously met, non of the criteria for alcohol/drug use disorder have been met at any time during a period of 12 months or longer (with the exception that Criterion A4,  Craving or strong desire or urge to use alcohol/drug  may be met).    Specify if:    This additional specifier is used if the individual is in an  environment where access to alcohol is restricted.    Mild: Presence of 2-3 symptoms    Moderate: Presence of 4-5 symptoms    Severe: Presence of 6 or more symptoms

## 2017-01-12 NOTE — PROGRESS NOTES
Met with Pt and updated his assessment.  Pt signed BENY and [facility] [place] [facility].

## 2017-01-13 VITALS
TEMPERATURE: 97.5 F | OXYGEN SATURATION: 98 % | WEIGHT: 225 LBS | HEART RATE: 89 BPM | HEIGHT: 76 IN | SYSTOLIC BLOOD PRESSURE: 138 MMHG | RESPIRATION RATE: 16 BRPM | DIASTOLIC BLOOD PRESSURE: 88 MMHG | BODY MASS INDEX: 27.4 KG/M2

## 2017-01-13 PROCEDURE — 25000132 ZZH RX MED GY IP 250 OP 250 PS 637: Performed by: PSYCHIATRY & NEUROLOGY

## 2017-01-13 PROCEDURE — 25000132 ZZH RX MED GY IP 250 OP 250 PS 637: Performed by: EMERGENCY MEDICINE

## 2017-01-13 RX ADMIN — VENLAFAXINE HYDROCHLORIDE 75 MG: 75 TABLET, EXTENDED RELEASE ORAL at 08:40

## 2017-01-13 RX ADMIN — FOLIC ACID 1 MG: 1 TABLET ORAL at 08:40

## 2017-01-13 RX ADMIN — NICOTINE 1 PATCH: 21 PATCH, EXTENDED RELEASE TRANSDERMAL at 08:39

## 2017-01-13 RX ADMIN — Medication 100 MG: at 08:40

## 2017-01-13 RX ADMIN — VITAMIN D, TAB 1000IU (100/BT) 2000 UNITS: 25 TAB at 08:40

## 2017-01-13 RX ADMIN — MULTIPLE VITAMINS W/ MINERALS TAB 1 TABLET: TAB at 08:40

## 2017-01-13 RX ADMIN — ACAMPROSATE CALCIUM 666 MG: 333 TABLET, DELAYED RELEASE ORAL at 08:40

## 2017-01-13 ASSESSMENT — ACTIVITIES OF DAILY LIVING (ADL)
LAUNDRY: WITH SUPERVISION
ORAL_HYGIENE: INDEPENDENT
GROOMING: INDEPENDENT
DRESS: STREET CLOTHES

## 2017-01-13 NOTE — PLAN OF CARE
Problem: General Plan of Care (Inpatient Behavioral)  Goal: Individualization/Patient Specific Goal (IP Behavioral)  The patient and/or their representative will achieve their patient-specific goals related to the plan of care.    The patient-specific goals include:Illnesses Management & Recovery  Patient identified as trigger for relapse.  Patient identified as a general wellness strategy.  Patient identified as a warning sign that they are in danger of relapse.  Patient identified as someone they cont on to get feedback from.  Patient identified as a way to take action when in danger of relapse.  Patient identified as a way to cope with stress or other symptoms.     Illnesses Management & Recovery  Patient identified stress as trigger for relapse.  Patient identified  Practicing relaxing as a general wellness strategy.  Patient identified feeling too confident as a warning sign that they are in danger of relapse.  Patient identified Luke as someone they cont on to get feedback from.  Patient identified go to a meeting as a way to take action when in danger of relapse.  Patient identifiedexercise  as a way to cope with stress or other symptoms.

## 2017-01-13 NOTE — PLAN OF CARE
Problem: Discharge Planning  Goal: Discharge Planning (Adult, OB, Behavioral, Peds)  Outcome: Adequate for Discharge Date Met:  01/13/17  Pt alcohol withdrawal is complete. Pt out on unit. Pt will be going to [facility] treatment program today. Family to transport. Pt tolerating new medications. Reports his mood as good. Denies SI. Appetite has been fair. Pt reports having some sleep issues. Not sleeping well. Discussed sleep hygiene. Denies SE of medications See discharge instructions.

## 2017-01-13 NOTE — DISCHARGE INSTRUCTIONS
Behavioral Discharge Planning and Instructions  THANK YOU FOR CHOOSING THE [facility]  3A  [phone]    Summary: You were admitted to [facility] on 10/10/17 for detoxification from alcohol.  A medical exam was performed that included lab work. You have met with a  and opted to continue your treatement with [facility].  Please take care and make your recovery a priority, Wilber!     [address] [id number], [place] [address]  Ama:  [phone]    Main Diagnoses:  Per Dr. Lundy    DIAGNOSTIC IMPRESSION:    Axis I:  Alcohol use disorder, severe with withdrawal.    2.  Moderate major depression, recurrent, F33.1.    3.  Generalized anxiety disorder, F41.1.    Major Treatments, Procedures and Findings:  You were treated for alcohol detoxification using valium.New medications were ordered during your treatment say.   You have had a chemical dependency assessment.  You have had labs drawn and those results have been reviewed with you. Please take a copy of your lab work with you to your next primary care physician appointment.    Symptoms to Report:  If you experience more anxiety, confusion, sleeplessness, deep sadness or thoughts of suicide, notify your treatment team or notify your primary care physician. IF ANY OF THE SYMPTOMS YOU ARE EXPERIENCING ARE A MEDICAL EMERGENCY CALL 911 IMMEDIATELY.     Lifestyle Adjustment: Health Action Plan:  1.Create a daily schedule  2. Eat Healthy  3. Plan Enjoyable Sober Activities  4. Use Problem Solving Skills and Deal with Issues as they Arise.   5. Be Physically Active  6. Take your medications as prescribed  7. Get enough restful sleep  8. Practice Relaxation  9. Spend time with Supportive People  10. No use of alcohol, illegal drugs or addictive medications other than what is currently prescribed.   11.AA, NA Sponsor are excellent resources for support    Keeping hands clean is one of the  most important steps we can take to avoid getting sick and spreading germs to others.  Please wash your hands frequently.        Follow-up Appointment:   Dr. Lucas [facility]  Wednesday, February 15, 2017 @ 2:00 pm    Resources:   http://www.aastpaul.org/?topic=8  http://a[place].org/meetings/  http://www.nam[place].org/index.php/meeting-list-pdf  http://www.harmreduction.org  [facility] [phone]  Support Group:  AA/NA and Sponsor/support  Crisis Intervention: 383.562.6381 or 798-194-3731 (TTY: 280.140.8718).  Call anytime for help.  National [place] on Mental Illness (www.[place].sav.org): 997.315.1079 or 218-853-6527.  Alcoholics Anonymous (www.alcoholics-anonymous.org): Check your phone book for your local chapter.  Suicide Awareness Voices of Education (SAVE) (www.save.org): 544-964-NSCS (0459)  National Suicide Prevention Line (www.mentalhealthmn.org): 717-831-UJPG (8089)  Mental Health Consumer/Survivor Network of [place] (www.mhcsn.net): 877.180.9341 or 460-594-2791  Mental Health Association of [place] (www.mentalhealth.org): 289.479.8216 or 523-094-2919   Substance Abuse and Mental Health Services (www.samhsa.gov)   Www.smartrecovery.org  SM[facility]'s 4-Point Program  helps people recover from all types of addictive behaviors, including: alcoholism, drug abuse, substance abuse, drug addiction, alcohol abuse, gambling addiction, cocaine addiction, and addiction to other substances and activities.  [facility] (Self-Management And Recovery Training) is not a 12-step group, like Alcoholics Anonymous (AA) or Narcotics Anonymous (NA).  Teaches self-empowerment and self-reliance.  * Encourages individuals to recover and live satisfying lives.  * Teaches tools and techniques for self-directed change.  * Meetings are educational and include open discussions.  * Advocates the appropriate use of prescribed medications and psychological treatments.  * Evolves as scientific knowledge of  addiction recovery evolves.  Many of the modules are available online.      [place] [facility] ([facility])  [facility] connects people seeking recovery to resources that help foster and sustain long-term recovery.  Whether you are seeking resources for treatment, transportation, housing, job training, education, health care or other pathways to recovery, [facility] is a great place to start.  824.112.2507.  www.[facility].org    General Medication Instructions:   See your medication sheet(s) for instructions.   Take all medicines as directed.  Make no changes unless your doctor suggests them.   Go to all your doctor visits.  Be sure to have all your required lab tests. This way, your medicines can be refilled on time.  AA/NA and Sponsors are excellent resources for support and you can find one at any support group meeting.  Do not use any form of alcohol.    Please Note:  If you have any questions at anytime after you are discharged please call the [facility] detox unit 3AW unit at [phone].  [facility], Behavioral Intake [phone]  Please take this discharge folder with you to all your follow up appointments, it contains your lab results, diagnosis, medication list and discharge recommendations.      THANK YOU FOR CHOOSING THE [facility]     The treatment team has appreciated the opportunity to work with you.  We wish you the best in the future.

## 2017-01-13 NOTE — DISCHARGE SUMMARY
"Mr. Wilber Romo is a 31-year-old man admitted to [facility] detox unit on 1/10/2017.  He was admitted to the hospital to detoxify from alcohol.  He was here for detox in 12/2016 and left 12/08.  He was sober for 3 weeks.  He attributes his relapse to depression and anxiety.  He is here now because he needs to go through withdrawal and states, \"I can't do it again by myself.\"  There is a history of a withdrawal seizure about 2 years ago.  He has been drinking approximately 1/2 liter per day and his alcohol level was 0.288.  Liver labs were mildly elevated when he was here previously in December.        He has a history of attention deficit disorder as a child with previous treatments being Ritalin, Adderall and some other longer acting form.  He does not believe he has taken Strattera.  Currently, he feels this is a mild impact on his life.  He notes his father is taking attention deficit medications still.  Depression symptoms consist of hopelessness, not wanting to do much, sporadic sleep, decreased appetite and occasional racing thoughts.  Anxiety symptoms consist of social anxiety, nervousness, twitching, shaking, and palpitations.  He screens negative on the MDQ for bipolar illness.        He has taken 1 medication for depression, Lexapro with partial results.  He has not taken past antidepressants.  He has taken hydroxyzine for anxiety with fairly good results and no side effects.  He has not taken any medication for alcoholism.        MENTAL STATUS EXAMINATION:  Shows an alert, cooperative 31-year-old man.  He notes he feels hung over.  Eye contact is good.  Affect is fair to good.  Psychomotor behavior is normal.  There are no signs of psychosis.  Speech rate, production and volume are normal.  He is not suicidal.  He does not appear to be hallucinating.        VITAL SIGNS:  Temperature 98.1, heart rate 84, respirations 16, blood pressure 119/71. SPO2 98 on room air.        DIAGNOSTIC " IMPRESSION:    Axis I:  Alcohol use disorder, severe with withdrawal.    2.  Moderate major depression, recurrent, F33.1.    3.  Generalized anxiety disorder, F41.1.        PLAN:  Plan at this time is to switch the Lexapro to Effexor.  Campral will be started for alcohol cravings.  We discussed use of Strattera, but at this point he does not feel that is necessary.  He plans to go to [facility] following detoxification.      Hospital course:  He was detoxed with [facility] protocol and Valium.  Effexor was started as above.  Vitamin D was 17 and replacement started.  He was discharged to treatment, or home followed by treatment.  Prescription Medications as of 1/13/2017             cholecalciferol 2000 UNITS tablet Take 2,000 Units by mouth daily    hydrOXYzine (ATARAX) 25 MG tablet Take 1-2 tablets (25-50 mg) by mouth every 4 hours as needed for anxiety    folic acid (FOLVITE) 1 MG tablet Take 1 tablet (1 mg) by mouth daily    acamprosate (CAMPRAL) 333 MG EC tablet Take 2 tablets (666 mg) by mouth 3 times daily    multivitamin, therapeutic with minerals (THERA-VIT-M) TABS tablet Take 1 tablet by mouth daily    thiamine 100 MG tablet Take 1 tablet (100 mg) by mouth daily    venlafaxine (EFFEXOR-ER) 75 MG TB24 24 hr tablet Take 1 tablet (75 mg) by mouth daily (with breakfast)    traZODone (DESYREL) 50 MG tablet Take 1 tablet (50 mg) by mouth At Bedtime    hydrOXYzine (ATARAX) 25 MG tablet Take 1-2 tablets (25-50 mg) by mouth every 4 hours as needed for anxiety      Facility Administered Medications as of 1/13/2017             cholecalciferol (vitamin D) tablet 2,000 Units Take 2 tablets (2,000 Units) by mouth daily    hydrOXYzine (ATARAX) tablet 25-50 mg Take 1-2 tablets (25-50 mg) by mouth every 4 hours as needed for anxiety    nicotine Patch in Place Place onto the skin every 8 hours    nicotine patch REMOVAL Place onto the skin daily    nicotine (NICODERM CQ) 21 MG/24HR 24 hr patch 1 patch Place 1 patch onto  the skin daily    diazepam (VALIUM) tablet 5-20 mg Take 1-4 tablets (5-20 mg) by mouth every 30 minutes as needed for other (Dose according to patient's MSSA Score (see admin instructions))    thiamine tablet 100 mg Take 1 tablet (100 mg) by mouth daily    folic acid (FOLVITE) tablet 1 mg Take 1 tablet (1 mg) by mouth daily    multivitamin, therapeutic with minerals (THERA-VIT-M) tablet 1 tablet Take 1 tablet by mouth daily    venlafaxine (EFFEXOR-ER) 24 hr tablet 75 mg Take 1 tablet (75 mg) by mouth daily (with breakfast)    acamprosate (CAMPRAL) EC tablet 666 mg Take 2 tablets (666 mg) by mouth 3 times daily    traZODone (DESYREL) tablet 50 mg Take 1 tablet (50 mg) by mouth At Bedtime